# Patient Record
Sex: FEMALE | Race: BLACK OR AFRICAN AMERICAN | NOT HISPANIC OR LATINO | URBAN - METROPOLITAN AREA
[De-identification: names, ages, dates, MRNs, and addresses within clinical notes are randomized per-mention and may not be internally consistent; named-entity substitution may affect disease eponyms.]

---

## 2022-11-23 ENCOUNTER — EH NON-PROVIDER (OUTPATIENT)
Dept: OCCUPATIONAL MEDICINE | Facility: CLINIC | Age: 22
End: 2022-11-23

## 2022-11-23 ENCOUNTER — HOSPITAL ENCOUNTER (OUTPATIENT)
Facility: MEDICAL CENTER | Age: 22
End: 2022-11-23
Attending: PREVENTIVE MEDICINE
Payer: COMMERCIAL

## 2022-11-23 ENCOUNTER — EMPLOYEE HEALTH (OUTPATIENT)
Dept: OCCUPATIONAL MEDICINE | Facility: CLINIC | Age: 22
End: 2022-11-23

## 2022-11-23 DIAGNOSIS — Z02.1 PRE-EMPLOYMENT HEALTH SCREENING EXAMINATION: ICD-10-CM

## 2022-11-23 DIAGNOSIS — Z02.1 PRE-EMPLOYMENT DRUG SCREENING: Primary | ICD-10-CM

## 2022-11-23 DIAGNOSIS — Z02.1 PRE-EMPLOYMENT DRUG SCREENING: ICD-10-CM

## 2022-11-23 LAB
AMP AMPHETAMINE: NORMAL
BAR BARBITURATES: NORMAL
BZO BENZODIAZEPINES: NORMAL
COC COCAINE: NORMAL
INT CON NEG: NORMAL
INT CON POS: NORMAL
MDMA ECSTASY: NORMAL
MET METHAMPHETAMINES: NORMAL
MTD METHADONE: NORMAL
OPI OPIATES: NORMAL
OXY OXYCODONE: NORMAL
PCP PHENCYCLIDINE: NORMAL
POC URINE DRUG SCREEN OCDRS: NEGATIVE
THC: NORMAL

## 2022-11-23 PROCEDURE — 86762 RUBELLA ANTIBODY: CPT | Performed by: PREVENTIVE MEDICINE

## 2022-11-23 PROCEDURE — 90686 IIV4 VACC NO PRSV 0.5 ML IM: CPT | Performed by: NURSE PRACTITIONER

## 2022-11-23 PROCEDURE — 86735 MUMPS ANTIBODY: CPT | Performed by: PREVENTIVE MEDICINE

## 2022-11-23 PROCEDURE — 86787 VARICELLA-ZOSTER ANTIBODY: CPT | Performed by: PREVENTIVE MEDICINE

## 2022-11-23 PROCEDURE — 80305 DRUG TEST PRSMV DIR OPT OBS: CPT | Performed by: PREVENTIVE MEDICINE

## 2022-11-23 PROCEDURE — 86765 RUBEOLA ANTIBODY: CPT | Performed by: PREVENTIVE MEDICINE

## 2022-11-23 PROCEDURE — 94375 RESPIRATORY FLOW VOLUME LOOP: CPT | Performed by: PREVENTIVE MEDICINE

## 2022-11-23 PROCEDURE — 8915 PR COMPREHENSIVE PHYSICAL: Performed by: PREVENTIVE MEDICINE

## 2022-11-23 PROCEDURE — 86480 TB TEST CELL IMMUN MEASURE: CPT | Performed by: PREVENTIVE MEDICINE

## 2022-11-25 LAB
GAMMA INTERFERON BACKGROUND BLD IA-ACNC: 0.07 IU/ML
M TB IFN-G BLD-IMP: NEGATIVE
M TB IFN-G CD4+ BCKGRND COR BLD-ACNC: 0.02 IU/ML
MITOGEN IGNF BCKGRD COR BLD-ACNC: >10 IU/ML
QFT TB2 - NIL TBQ2: -0.01 IU/ML

## 2022-11-27 ENCOUNTER — OFFICE VISIT (OUTPATIENT)
Dept: URGENT CARE | Facility: CLINIC | Age: 22
End: 2022-11-27
Payer: COMMERCIAL

## 2022-11-27 VITALS
RESPIRATION RATE: 15 BRPM | SYSTOLIC BLOOD PRESSURE: 116 MMHG | HEIGHT: 65 IN | BODY MASS INDEX: 24.16 KG/M2 | HEART RATE: 85 BPM | DIASTOLIC BLOOD PRESSURE: 72 MMHG | OXYGEN SATURATION: 98 % | WEIGHT: 145 LBS | TEMPERATURE: 97.5 F

## 2022-11-27 DIAGNOSIS — H60.502 ACUTE OTITIS EXTERNA OF LEFT EAR, UNSPECIFIED TYPE: ICD-10-CM

## 2022-11-27 PROCEDURE — 99203 OFFICE O/P NEW LOW 30 MIN: CPT | Performed by: STUDENT IN AN ORGANIZED HEALTH CARE EDUCATION/TRAINING PROGRAM

## 2022-11-27 RX ORDER — OFLOXACIN 3 MG/ML
5 SOLUTION AURICULAR (OTIC) DAILY
Qty: 7 ML | Refills: 0 | Status: SHIPPED | OUTPATIENT
Start: 2022-11-27 | End: 2022-12-04

## 2022-11-27 RX ORDER — OFLOXACIN 3 MG/ML
5 SOLUTION AURICULAR (OTIC) DAILY
Qty: 7 ML | Refills: 0 | Status: SHIPPED | OUTPATIENT
Start: 2022-11-27 | End: 2022-11-27

## 2022-11-27 NOTE — PROGRESS NOTES
"Subjective:   Shandra Siegel is a 21 y.o. female who presents for Otalgia      HPI:  Is a 21-year-old female presents to the urgent care for 3 days of left ear pain that radiates to the outside of her ear mainly in the front.  She reports that touching the outside of her ear does hurt as well.  She denies any drainage coming from the ear.  She states that her hearing does feel slightly muffled but she is able to hear.  No recent sick contacts or recent travel.  She denies any recent earwax removal kits or using Q-tips.  No recent swimming.  Patient denies fever, chills, diaphoresis, rash, sore throat, eye discharge, eye redness, chest pain, nausea, vomiting, cough, dizziness, or headache.    Medications:    ofloxacin otic sol Soln    Allergies: Patient has no known allergies.    Problem List: Shandra Sieegl does not have a problem list on file.    Surgical History:  No past surgical history on file.    Past Social Hx: Shandra Siegel  reports that she has never smoked. She has never used smokeless tobacco. She reports that she does not currently use drugs.     Past Family Hx:  Shandra Seigel family history is not on file.     Problem list, medications, and allergies reviewed by myself today in Epic.     Objective:     /72 (BP Location: Right arm, Patient Position: Sitting, BP Cuff Size: Adult long)   Pulse 85   Temp 36.4 °C (97.5 °F) (Temporal)   Resp 15   Ht 1.651 m (5' 5\")   Wt 65.8 kg (145 lb)   SpO2 98%   BMI 24.13 kg/m²     Physical Exam  Vitals reviewed.   Constitutional:       General: She is not in acute distress.     Appearance: Normal appearance.   HENT:      Head: Normocephalic.      Right Ear: Tympanic membrane, ear canal and external ear normal. No middle ear effusion. Tympanic membrane is not injected, perforated, erythematous or bulging.      Left Ear: Tympanic membrane, ear canal and external ear normal.  No middle ear effusion. Tympanic membrane is not injected, perforated, erythematous or " bulging.      Ears:      Comments: SomeLeft ear: Tragal tenderness present.  Erythema present to the ear canal and there is discharge as well.  Mild amount of swelling present to the ear canal.     Nose: Nose normal.      Mouth/Throat:      Mouth: Mucous membranes are moist.   Eyes:      Conjunctiva/sclera: Conjunctivae normal.      Pupils: Pupils are equal, round, and reactive to light.   Cardiovascular:      Rate and Rhythm: Normal rate and regular rhythm.      Pulses: Normal pulses.      Heart sounds: Normal heart sounds. No murmur heard.  Pulmonary:      Effort: Pulmonary effort is normal. No respiratory distress.      Breath sounds: Normal breath sounds. No stridor. No wheezing, rhonchi or rales.   Musculoskeletal:      Cervical back: Normal range of motion.   Lymphadenopathy:      Cervical: No cervical adenopathy.   Skin:     General: Skin is warm and dry.      Capillary Refill: Capillary refill takes less than 2 seconds.      Findings: No erythema, lesion or rash.   Neurological:      General: No focal deficit present.      Mental Status: She is alert and oriented to person, place, and time.       Assessment/Plan:     Diagnosis and associated orders:     1. Acute otitis externa of left ear, unspecified type  ofloxacin otic sol (FLOXIN OTIC) 0.3 % Solution         Comments/MDM:     Patient's presentation physical exam findings are consistent with acute otitis externa of the left ear.  No signs of malignant otitis externa.  No signs of otitis media.  No cellulitis to the outside of the ear.  I do not suspect mastoiditis.  No tenderness to the mastoid and no protrusion of the ear from the head.  No red flag signs.  Vitals all within normal limits.  Patient may use ibuprofen/Tylenol as needed for ear discomfort.  May use cool compresses to the outside of the ear.  We will treat with ofloxacin.  Patient was educated on the use this medication and the possible side effects including allergic reaction.  ED/return  precautions were given.         Differential diagnosis, natural history, supportive care, and indications for immediate follow-up discussed.    Advised the patient to follow-up with the primary care physician for recheck, reevaluation, and consideration of further management.    Please note that this dictation was created using voice recognition software. I have made a reasonable attempt to correct obvious errors, but I expect that there are errors of grammar and possibly content that I did not discover before finalizing the note.    Electronically signed by Gunner Conn PA-C.

## 2022-12-09 ENCOUNTER — EH NON-PROVIDER (OUTPATIENT)
Dept: OCCUPATIONAL MEDICINE | Facility: CLINIC | Age: 22
End: 2022-12-09

## 2023-06-24 ENCOUNTER — OFFICE VISIT (OUTPATIENT)
Dept: URGENT CARE | Facility: CLINIC | Age: 23
End: 2023-06-24
Payer: COMMERCIAL

## 2023-06-24 VITALS
HEART RATE: 74 BPM | SYSTOLIC BLOOD PRESSURE: 96 MMHG | HEIGHT: 65 IN | OXYGEN SATURATION: 99 % | WEIGHT: 154.6 LBS | DIASTOLIC BLOOD PRESSURE: 50 MMHG | TEMPERATURE: 98.7 F | RESPIRATION RATE: 14 BRPM | BODY MASS INDEX: 25.76 KG/M2

## 2023-06-24 DIAGNOSIS — J02.9 PHARYNGITIS, UNSPECIFIED ETIOLOGY: ICD-10-CM

## 2023-06-24 DIAGNOSIS — J06.9 VIRAL URI: ICD-10-CM

## 2023-06-24 LAB — S PYO DNA SPEC NAA+PROBE: NOT DETECTED

## 2023-06-24 PROCEDURE — 3074F SYST BP LT 130 MM HG: CPT | Performed by: NURSE PRACTITIONER

## 2023-06-24 PROCEDURE — 87651 STREP A DNA AMP PROBE: CPT | Performed by: NURSE PRACTITIONER

## 2023-06-24 PROCEDURE — 3078F DIAST BP <80 MM HG: CPT | Performed by: NURSE PRACTITIONER

## 2023-06-24 PROCEDURE — 99213 OFFICE O/P EST LOW 20 MIN: CPT | Performed by: NURSE PRACTITIONER

## 2023-06-24 ASSESSMENT — ENCOUNTER SYMPTOMS
SORE THROAT: 1
SWOLLEN GLANDS: 1
RESPIRATORY NEGATIVE: 1
CARDIOVASCULAR NEGATIVE: 1
MYALGIAS: 1
CHILLS: 1
TROUBLE SWALLOWING: 0

## 2023-06-24 ASSESSMENT — VISUAL ACUITY: OU: 1

## 2023-06-24 NOTE — PROGRESS NOTES
"Subjective:     Shandra Siegel is a 22 y.o. female who presents for Pharyngitis (Pt has a sore throat, weakness, headache, body aches, congestion x last night )       Pharyngitis   This is a new problem. The problem has been gradually worsening. Associated symptoms include congestion and swollen glands. Pertinent negatives include no trouble swallowing. She has tried acetaminophen for the symptoms.     Monday, patient developed sore throat.  Had initial improvement.  However, worsened yesterday with chills and congestion.  Glands feel swollen.  Taking Tylenol.  Denies concern for Covid.    Review of Systems   Constitutional:  Positive for chills.   HENT:  Positive for congestion and sore throat. Negative for trouble swallowing.    Respiratory: Negative.     Cardiovascular: Negative.    Musculoskeletal:  Positive for myalgias.   All other systems reviewed and are negative.    Refer to HPI for additional details.    During this visit, appropriate PPE was worn, and hand hygiene was performed.    PMH:  has no past medical history on file.    MEDS:   Current Outpatient Medications:     Acetaminophen (TYLENOL PO), Take  by mouth., Disp: , Rfl:     Pseudoeph-Doxylamine-DM-APAP (DAYQUIL/NYQUIL COLD/FLU RELIEF PO), Take  by mouth., Disp: , Rfl:     ALLERGIES: No Known Allergies  SURGHX: History reviewed. No pertinent surgical history.  SOCHX:  reports that she has never smoked. She has never used smokeless tobacco. She reports current alcohol use. She reports that she does not currently use drugs.    FH: Per HPI as applicable/pertinent.      Objective:     BP 96/50 (BP Location: Left arm, Patient Position: Sitting, BP Cuff Size: Adult)   Pulse 74   Temp 37.1 °C (98.7 °F) (Temporal)   Resp 14   Ht 1.651 m (5' 5\")   Wt 70.1 kg (154 lb 9.6 oz)   SpO2 99%   BMI 25.73 kg/m²     Physical Exam  Nursing note reviewed.   Constitutional:       General: She is not in acute distress.     Appearance: She is well-developed. She is " not ill-appearing or toxic-appearing.   HENT:      Mouth/Throat:      Mouth: Mucous membranes are moist.      Pharynx: Uvula midline. Pharyngeal swelling and posterior oropharyngeal erythema present.   Eyes:      General: Vision grossly intact.   Cardiovascular:      Rate and Rhythm: Normal rate.   Pulmonary:      Effort: Pulmonary effort is normal. No respiratory distress.      Comments: Phonation normal, speaks in full sentences, no audible wheeze or stridor   Musculoskeletal:         General: No deformity. Normal range of motion.      Cervical back: Neck supple.   Lymphadenopathy:      Cervical: Cervical adenopathy present.   Skin:     General: Skin is warm and dry.      Coloration: Skin is not pale.   Neurological:      Mental Status: She is alert and oriented to person, place, and time.      Motor: No weakness.   Psychiatric:         Behavior: Behavior normal. Behavior is cooperative.     POCT Cepheid Group A Strep by PCR: negative      Assessment/Plan:     1. Pharyngitis, unspecified etiology  - POCT GROUP A STREP, PCR    2. Viral URI    Discussed likely self-limiting viral etiology and expected course and duration of illness. Vital signs stable, afebrile, no acute distress at this time.     Differential diagnosis, natural history, supportive care, rest, fluids, gargles, over-the-counter symptom management per 's instructions, ibuprofen, APAP, lozenges, close monitoring, and indications for immediate follow-up discussed.     Monitor. Warning signs reviewed. Return precautions discussed.     All questions answered. Patient agrees with the plan of care.    Discharge summary provided.    Work note provided.

## 2023-06-24 NOTE — LETTER
June 24, 2023         Patient: Shandra Siegel   YOB: 2000   Date of Visit: 6/24/2023           To Whom it May Concern:    Shandra Siegel was seen in my clinic on 6/24/2023.    If you have any questions or concerns, please don't hesitate to call.        Sincerely,         PATRICK Uribe  Electronically Signed

## 2023-12-01 ENCOUNTER — EH NON-PROVIDER (OUTPATIENT)
Dept: OCCUPATIONAL MEDICINE | Facility: CLINIC | Age: 23
End: 2023-12-01

## 2023-12-01 DIAGNOSIS — Z02.89 ENCOUNTER FOR OCCUPATIONAL HEALTH EXAMINATION INVOLVING RESPIRATOR: ICD-10-CM

## 2023-12-01 PROCEDURE — 94375 RESPIRATORY FLOW VOLUME LOOP: CPT | Performed by: NURSE PRACTITIONER

## 2024-12-26 ENCOUNTER — EH NON-PROVIDER (OUTPATIENT)
Dept: OCCUPATIONAL MEDICINE | Facility: CLINIC | Age: 24
End: 2024-12-26

## 2024-12-26 DIAGNOSIS — Z02.89 ENCOUNTER FOR OCCUPATIONAL HEALTH ASSESSMENT: ICD-10-CM

## 2024-12-26 PROCEDURE — 94375 RESPIRATORY FLOW VOLUME LOOP: CPT | Performed by: NURSE PRACTITIONER
